# Patient Record
Sex: FEMALE | Race: WHITE | NOT HISPANIC OR LATINO | Employment: UNEMPLOYED | URBAN - METROPOLITAN AREA
[De-identification: names, ages, dates, MRNs, and addresses within clinical notes are randomized per-mention and may not be internally consistent; named-entity substitution may affect disease eponyms.]

---

## 2022-06-08 NOTE — PATIENT INSTRUCTIONS
Health Maintenance Due   Topic Date Due    ANNUAL PHYSICAL  Never done    COVID-19 Vaccine (1) Never done    TDAP/TD VACCINES (1 - Tdap) Never done    HEPATITIS C SCREENING  Never done    PAP SMEAR  Never done    Call if has tried Timed Release melatonin and will refer to sleep    Let other family members know is taking new anxiety meds-stop if problems.    Call if can help stop smoking

## 2022-06-10 ENCOUNTER — OFFICE VISIT (OUTPATIENT)
Dept: FAMILY MEDICINE CLINIC | Facility: CLINIC | Age: 42
End: 2022-06-10

## 2022-06-10 VITALS
TEMPERATURE: 97.1 F | OXYGEN SATURATION: 95 % | HEIGHT: 63 IN | BODY MASS INDEX: 23.25 KG/M2 | SYSTOLIC BLOOD PRESSURE: 129 MMHG | DIASTOLIC BLOOD PRESSURE: 67 MMHG | WEIGHT: 131.2 LBS | HEART RATE: 65 BPM

## 2022-06-10 DIAGNOSIS — F41.9 ANXIETY: ICD-10-CM

## 2022-06-10 DIAGNOSIS — G89.29 CHRONIC BILATERAL LOW BACK PAIN WITH BILATERAL SCIATICA: ICD-10-CM

## 2022-06-10 DIAGNOSIS — Z11.59 ENCOUNTER FOR HEPATITIS C VIRUS SCREENING TEST FOR HIGH RISK PATIENT: ICD-10-CM

## 2022-06-10 DIAGNOSIS — G47.9 SLEEP DISORDER: ICD-10-CM

## 2022-06-10 DIAGNOSIS — Z13.220 SCREENING CHOLESTEROL LEVEL: ICD-10-CM

## 2022-06-10 DIAGNOSIS — Z00.01 ENCOUNTER FOR ANNUAL GENERAL MEDICAL EXAMINATION WITH ABNORMAL FINDINGS IN ADULT: Primary | ICD-10-CM

## 2022-06-10 DIAGNOSIS — Z11.4 SCREENING FOR HIV (HUMAN IMMUNODEFICIENCY VIRUS): ICD-10-CM

## 2022-06-10 DIAGNOSIS — M54.42 CHRONIC BILATERAL LOW BACK PAIN WITH BILATERAL SCIATICA: ICD-10-CM

## 2022-06-10 DIAGNOSIS — Z91.89 ENCOUNTER FOR HEPATITIS C VIRUS SCREENING TEST FOR HIGH RISK PATIENT: ICD-10-CM

## 2022-06-10 DIAGNOSIS — Z80.3 FAMILY HISTORY OF BREAST CANCER: ICD-10-CM

## 2022-06-10 DIAGNOSIS — R13.11 ORAL PHASE DYSPHAGIA: ICD-10-CM

## 2022-06-10 DIAGNOSIS — Z72.0 TOBACCO ABUSE: ICD-10-CM

## 2022-06-10 DIAGNOSIS — E55.9 VITAMIN D DEFICIENCY: ICD-10-CM

## 2022-06-10 DIAGNOSIS — M54.41 CHRONIC BILATERAL LOW BACK PAIN WITH BILATERAL SCIATICA: ICD-10-CM

## 2022-06-10 DIAGNOSIS — Z12.4 SCREENING FOR CERVICAL CANCER: ICD-10-CM

## 2022-06-10 DIAGNOSIS — Z13.1 SCREENING FOR DIABETES MELLITUS: ICD-10-CM

## 2022-06-10 DIAGNOSIS — G44.229 CHRONIC TENSION-TYPE HEADACHE, NOT INTRACTABLE: ICD-10-CM

## 2022-06-10 PROBLEM — M54.50 CHRONIC BILATERAL LOW BACK PAIN: Status: ACTIVE | Noted: 2022-06-10

## 2022-06-10 PROCEDURE — 99204 OFFICE O/P NEW MOD 45 MIN: CPT | Performed by: PREVENTIVE MEDICINE

## 2022-06-10 PROCEDURE — 99386 PREV VISIT NEW AGE 40-64: CPT | Performed by: PREVENTIVE MEDICINE

## 2022-06-10 RX ORDER — BUSPIRONE HYDROCHLORIDE 7.5 MG/1
7.5 TABLET ORAL 2 TIMES DAILY PRN
Qty: 60 TABLET | Refills: 2 | Status: SHIPPED | OUTPATIENT
Start: 2022-06-10 | End: 2022-07-29

## 2022-06-10 RX ORDER — IBUPROFEN 200 MG
200 TABLET ORAL EVERY 6 HOURS PRN
COMMUNITY

## 2022-06-10 NOTE — PROGRESS NOTES
Subjective   Triny Reynaga is a 41 y.o. female presents for   Chief Complaint   Patient presents with   • Establish Care   • Back Pain     Lower back been that way for years.    • Anxiety     The patient who is establishing care and here for her annual wellness exam has been advised to wear sunscreen and seatbelt.  She is adamant that she wants some sort of narcotic relief for her chronic low back pain that she has had since age 10 we advised that we will be glad to refer her to pain management that we do not prescribe opiates on a chronic basis she became quite angry and I am not sure that she will come back.  She has no recent back injuries no fever chills night sweats or weight loss gets pain into both legs does have a family history of breast cancer so we will advised that she get screened for that she continues to smoke cigarettes and we have advised her that she probably will not get rid of her back pain as long as she continues to smoke.  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  She was requesting Xanax but we told her we do not prescribe that medication either and she was provided with BuSpar 7.5 mg she can take it up to twice per day and will call back and let us know if she would consider referral to behavioral health as they might prescribe her Xanax for her.  Finally she is having some difficulty swallowing due to the fact that she has no teeth and she is trying to get dentures so that she can chew her food more thoroughly and help prevent this.  She has had some chronic tension headaches gets 1/day and takes over-the-counter medicines which may or may not help her.  Health Maintenance Due   Topic Date Due   • ANNUAL PHYSICAL  Never done   • COVID-19 Vaccine (1) Never done   • Pneumococcal Vaccine 0-64 (1 - PCV) Never done   • TDAP/TD VACCINES (1 - Tdap) Never done   • HEPATITIS C SCREENING  Never done   • PAP  "SMEAR  Never done       History of Present Illness     Vitals:    06/10/22 0947 06/10/22 0950   BP: 117/73 129/67   BP Location: Left arm Right arm   Patient Position: Sitting Sitting   Cuff Size: Adult Adult   Pulse: 65    Temp: 97.1 °F (36.2 °C)    TempSrc: Temporal    SpO2: 95%    Weight: 59.5 kg (131 lb 3.2 oz)    Height: 160 cm (63\")      Body mass index is 23.24 kg/m².    Current Outpatient Medications on File Prior to Visit   Medication Sig Dispense Refill   • aspirin-acetaminophen-caffeine (Excedrin Migraine) 250-250-65 MG per tablet Take 1 tablet by mouth Every 6 (Six) Hours As Needed for Headache.     • ibuprofen (ADVIL,MOTRIN) 200 MG tablet Take 200 mg by mouth Every 6 (Six) Hours As Needed for Mild Pain .       No current facility-administered medications on file prior to visit.       The following portions of the patient's history were reviewed and updated as appropriate: allergies, current medications, past family history, past medical history, past social history, past surgical history and problem list.    Review of Systems   HENT: Positive for dental problem.    Musculoskeletal: Positive for arthralgias, back pain, gait problem and myalgias.   Neurological: Positive for headache.   Psychiatric/Behavioral: Positive for dysphoric mood, sleep disturbance and stress. The patient is nervous/anxious.        Objective   Physical Exam  Vitals reviewed.   Constitutional:       General: She is not in acute distress.     Appearance: Normal appearance. She is well-developed. She is not ill-appearing or toxic-appearing.   HENT:      Head: Normocephalic and atraumatic.      Right Ear: Tympanic membrane, ear canal and external ear normal.      Left Ear: Tympanic membrane, ear canal and external ear normal.      Nose: Nose normal.      Mouth/Throat:      Mouth: Mucous membranes are moist.      Pharynx: No posterior oropharyngeal erythema.   Eyes:      Extraocular Movements: Extraocular movements intact.      " Conjunctiva/sclera: Conjunctivae normal.      Pupils: Pupils are equal, round, and reactive to light.   Cardiovascular:      Rate and Rhythm: Normal rate and regular rhythm.      Heart sounds: Normal heart sounds.   Pulmonary:      Effort: Pulmonary effort is normal.      Comments: Decreased breath sounds bilaterally  Abdominal:      General: Bowel sounds are normal. There is no distension.      Palpations: Abdomen is soft. There is no mass.      Tenderness: There is no abdominal tenderness.   Musculoskeletal:         General: Tenderness, deformity and signs of injury present.      Cervical back: Neck supple.      Comments: Patient is tender in bandlike fashion along the L3 area forward flexion and extension were about 75% right left lateral range of motion full toe heel walk and squat were strong straight leg raising causes back pain only hip extension cause some pain down into the right leg when the right leg was extended left leg when the left leg was extended both in the posterior thigh knee and ankle reflexes revealed a right ankle reflex at +2 only.  Rest were absent.  Mild thoracolumbar scoliosis.   Skin:     General: Skin is warm.   Neurological:      General: No focal deficit present.      Mental Status: She is alert and oriented to person, place, and time.   Psychiatric:         Mood and Affect: Mood normal.         Behavior: Behavior normal.       PHQ-9 Total Score: 0    Assessment & Plan   Diagnoses and all orders for this visit:    1. Encounter for annual general medical examination with abnormal findings in adult (Primary)  Comments:  Patient has been advised to wear sunscreen and a seatbelt  Orders:  -     CBC Auto Differential; Future    2. Screening for HIV (human immunodeficiency virus)  -     HIV-1 & HIV-2 Antibodies; Future    3. Encounter for hepatitis C virus screening test for high risk patient  -     Hepatitis C Antibody; Future    4. Screening for cervical cancer  Comments:  Patient will go  to [facility].    5. Screening cholesterol level  -     Lipid Panel; Future    6. Screening for diabetes mellitus  -     Comprehensive Metabolic Panel; Future    7. Vitamin D deficiency  Comments:  Patient does not take vitamin D regularly  Orders:  -     Vitamin D 25 Hydroxy; Future    8. Chronic bilateral low back pain with bilateral sciatica  Comments:  Patient injured her tailbone area at age 10 and has had chronic low back pain since that time no recent injury fever chills night sweats or weight loss.  Orders:  -     XR Spine Lumbar 4+ View; Future  -     Ambulatory Referral to Physical Therapy Evaluate and treat    9. Family history of breast cancer  -     Mammo Screening Digital Tomosynthesis Bilateral With CAD; Future    10. Tobacco abuse  Comments:  Patient has been advised to stop smoking or she may never rid herself of her back pain patient is not ready to stop.    11. Chronic tension-type headache, not intractable  Comments:  Patient states that she gets 1 headache a day when she is under so much tension and stress.  No history of head trauma    12. Sleep disorder  Comments:  Patient has trouble falling asleep and staying asleep and will try time-released melatonin and let us know if that is not working    13. Oral phase dysphagia  Comments:  Patient's is having her all of her teeth pulled and she feels as though her difficulty swallowing is because she cannot chew and is trying to get dentures.    14. Anxiety  Comments:  Stress due to 27-year-old autistic son and caring for elderly parents.  Says Xanax really helps we advised to BuSpar since we do not prescribe that 7.5 mg given    Other orders  -     busPIRone (BUSPAR) 7.5 MG tablet; Take 1 tablet by mouth 2 (Two) Times a Day As Needed (anxiety).  Dispense: 60 tablet; Refill: 2        Patient Instructions     Health Maintenance Due   Topic Date Due   • ANNUAL PHYSICAL  Never done   • COVID-19 Vaccine (1) Never done   • TDAP/TD VACCINES (1 -  Tdap) Never done   • HEPATITIS C SCREENING  Never done   • PAP SMEAR  Never done    Call if has tried Timed Release melatonin and will refer to sleep    Let other family members know is taking new anxiety meds-stop if problems.    Call if can help stop smoking

## 2022-06-17 ENCOUNTER — TELEPHONE (OUTPATIENT)
Dept: FAMILY MEDICINE CLINIC | Facility: CLINIC | Age: 42
End: 2022-06-17

## 2022-06-21 ENCOUNTER — TELEPHONE (OUTPATIENT)
Dept: FAMILY MEDICINE CLINIC | Facility: CLINIC | Age: 42
End: 2022-06-21

## 2022-07-29 RX ORDER — BUSPIRONE HYDROCHLORIDE 7.5 MG/1
7.5 TABLET ORAL 2 TIMES DAILY PRN
Qty: 60 TABLET | Refills: 2 | Status: SHIPPED | OUTPATIENT
Start: 2022-07-29 | End: 2022-09-12

## 2022-09-12 RX ORDER — BUSPIRONE HYDROCHLORIDE 7.5 MG/1
7.5 TABLET ORAL 2 TIMES DAILY PRN
Qty: 180 TABLET | Refills: 0 | Status: SHIPPED | OUTPATIENT
Start: 2022-09-12 | End: 2023-01-05

## 2022-10-24 ENCOUNTER — TELEPHONE (OUTPATIENT)
Dept: FAMILY MEDICINE CLINIC | Facility: CLINIC | Age: 42
End: 2022-10-24

## 2023-01-05 RX ORDER — BUSPIRONE HYDROCHLORIDE 7.5 MG/1
7.5 TABLET ORAL 2 TIMES DAILY PRN
Qty: 180 TABLET | Refills: 0 | Status: SHIPPED | OUTPATIENT
Start: 2023-01-05 | End: 2023-04-05

## 2023-02-09 ENCOUNTER — PATIENT MESSAGE (OUTPATIENT)
Dept: FAMILY MEDICINE CLINIC | Facility: CLINIC | Age: 43
End: 2023-02-09
Payer: COMMERCIAL

## 2023-03-31 ENCOUNTER — TELEPHONE (OUTPATIENT)
Dept: FAMILY MEDICINE CLINIC | Facility: CLINIC | Age: 43
End: 2023-03-31
Payer: COMMERCIAL

## 2023-03-31 NOTE — TELEPHONE ENCOUNTER
----- Message from Laxmi Johnson sent at 3/31/2023  9:00 AM EDT -----      ----- Message -----  From: Laxmi Johnson  Sent: 11/11/2022   7:39 PM EDT  To: Laxmi Johnson        ----- Message -----  From: SYSTEM  Sent: 6/20/2022   1:25 AM EST  To: Elidia Davila [facility]

## 2023-04-05 RX ORDER — BUSPIRONE HYDROCHLORIDE 7.5 MG/1
TABLET ORAL
Qty: 180 TABLET | Refills: 0 | Status: SHIPPED | OUTPATIENT
Start: 2023-04-05

## 2023-05-04 ENCOUNTER — TELEPHONE (OUTPATIENT)
Dept: FAMILY MEDICINE CLINIC | Facility: CLINIC | Age: 43
End: 2023-05-04
Payer: COMMERCIAL

## 2023-05-04 NOTE — TELEPHONE ENCOUNTER
HUB TO READ:  As your healthcare provider, we are committed to helping you pursue good health. Timely checkups and preventative screenings are a part of keeping you healthy. In reviewing your chart, it appears that you are overdue for a mammogram from 6/10/2022. We want to urge you to follow up with a mammogram. You can have this done at any one of the [facility]. If you decide to go to another facility and need a referral or recommendation from us, please call our office at [phone] and we can help.    Also, our records may not be complete. If you have already had a mammogram this year, please let us know and we can update your chart.      If you don't plan on getting this done, we can also cancel the order.    Thank you for allowing me and our care team to be a part of your healthcare. If you have any questions, please feel free to contact us.

## 2023-10-22 RX ORDER — BUSPIRONE HYDROCHLORIDE 7.5 MG/1
TABLET ORAL
Qty: 180 TABLET | Refills: 0 | Status: SHIPPED | OUTPATIENT
Start: 2023-10-22

## 2023-10-22 NOTE — TELEPHONE ENCOUNTER
Rx Refill Note  Requested Prescriptions     Pending Prescriptions Disp Refills   • busPIRone (BUSPAR) 7.5 MG tablet [Pharmacy Med Name: BUSPIRONE HCL 7.5 MG TABLET] 180 tablet 0     Sig: TAKE 1 TABLET BY MOUTH TWICE A DAY AS NEEDED FOR ANXIETY      Last office visit with prescribing clinician: 6/10/2022      Next office visit with prescribing clinician: Visit date not found   3}  Laxmi Johnson  10/22/23, 11:32 EDT